# Patient Record
Sex: MALE | Race: WHITE
[De-identification: names, ages, dates, MRNs, and addresses within clinical notes are randomized per-mention and may not be internally consistent; named-entity substitution may affect disease eponyms.]

---

## 2021-03-12 ENCOUNTER — HOSPITAL ENCOUNTER (EMERGENCY)
Dept: HOSPITAL 41 - JD.ED | Age: 76
Discharge: HOME | End: 2021-03-12
Payer: MEDICARE

## 2021-03-12 DIAGNOSIS — K52.9: Primary | ICD-10-CM

## 2021-03-12 DIAGNOSIS — Z79.899: ICD-10-CM

## 2021-03-12 DIAGNOSIS — J45.909: ICD-10-CM

## 2021-03-12 PROCEDURE — 74019 RADEX ABDOMEN 2 VIEWS: CPT

## 2021-03-12 PROCEDURE — 96375 TX/PRO/DX INJ NEW DRUG ADDON: CPT

## 2021-03-12 PROCEDURE — 85025 COMPLETE CBC W/AUTO DIFF WBC: CPT

## 2021-03-12 PROCEDURE — 96374 THER/PROPH/DIAG INJ IV PUSH: CPT

## 2021-03-12 PROCEDURE — 99284 EMERGENCY DEPT VISIT MOD MDM: CPT

## 2021-03-12 PROCEDURE — 36415 COLL VENOUS BLD VENIPUNCTURE: CPT

## 2021-03-12 PROCEDURE — 80053 COMPREHEN METABOLIC PANEL: CPT

## 2021-03-12 NOTE — CR
Abdomen: Upright view the abdomen was obtained as well as supine exam.

 

Bowel gas pattern appears within normal limits.  There are air-fluid 

levels being seen within the colon presumably due to diarrhea.

 

Density is seen within the left mid upper abdomen either representing 

dystrophic calcification or bowel content.  Phleboliths are seen 

within the pelvis.  

 

Mild degenerative change is seen within the sacroiliac joints with 

endplate spurring also noted within the spine.  No free air is seen.  

There is slight pleural thickening seen within the right lung base 

which is stable.

 

Impression:

1.  Air-fluid levels within nondilated colon presumably due to 

diarrhea.

2.  Other findings believed to be incidental as noted above.

 

Diagnostic code #2

## 2021-03-12 NOTE — EDM.PDOC
ED HPI GENERAL MEDICAL PROBLEM





- General


Chief Complaint: Gastrointestinal Problem


Stated Complaint: VOMITING


Time Seen by Provider: 03/12/21 11:44


Source of Information: Reports: Patient, Family (wife), RN Notes Reviewed


History Limitations: Reports: No Limitations





- History of Present Illness


INITIAL COMMENTS - FREE TEXT/NARRATIVE: 





Patient is a 75-year-old male who presents to the ED with his wife for 

evaluation of his vomiting and diarrhea.  The patient and his wife notes that 

around 3 or 4 this morning, he developed some severe diarrhea and vomiting bile.

 He is complaining of some diaphoresis and hot he is complaining of generalized 

body aches but no chest pain, no abdomen pain  flashes but no fevers or chills, 

Or any other major issues.  The patient thinks that he could maybe have a touch 

of food poisoning but his wife ate the same supper he had last night without 

issues.  Patient did have his second COVID-19 shot on Wednesday, roughly 2 days 

ago.  No one else is sick like this within the household.  Patient's primary 

care provider is Dr. Vincent.  Again patient denies any other sick-like symptoms,

fever/chills, cough/shortness of breath.





  ** Generalized


Pain Score (Numeric/FACES): 4





- Related Data


                                    Allergies











Allergy/AdvReac Type Severity Reaction Status Date / Time


 


No Known Allergies Allergy   Verified 03/12/21 11:47











Home Meds: 


                                    Home Meds





Fluticasone Propionate [Flovent  MCG] 1 puff INH BID 05/21/18 [History]


ALPRAZolam [Xanax] 0.25 mg PO TID 10/22/20 [History]


Albuterol [Proventil HFA] 2 puff INH Q4H 10/22/20 [History]


Cholecalciferol (Vitamin D3) [Vitamin D] 0 unit PO DAILY 10/22/20 [History]


Dapsone 1 tab PO BID 10/22/20 [History]


Fexofenadine [Allegra] 180 mg PO DAILY 10/22/20 [History]


Ibuprofen [Ibuprofen Ib] 200 - 600 mg PO Q6H PRN 10/22/20 [History]


Multivit-Min/FA/Lycopen/Lutein [Centrum Silver Ultra Men's] 1 tab PO DAILY 10

/22/20 [History]


Triamcinolone Acetonide [Triamcinolone Acetonide 0.1% Crm] 1 applic TOP BID 

10/22/20 [History]


Vitamin A 2,400 mg PO DAILY 10/22/20 [History]


Vitamin E Acetate [Vitamin E] 0 unit PO DAILY 10/22/20 [History]


amLODIPine [Norvasc] 5 mg PO DAILY 10/22/20 [History]


hydrOXYzine HCL [hydrOXYzine] 25 mg PO BID PRN 10/22/20 [History]


Non-Formulary Medication [NF Drug] 1 puff INH BID 10/23/20 [History]


Albuterol/Ipratropium [DuoNeb 3.0-0.5 MG/3 ML] 3 ml NEB Q4H PRN  neb 10/25/20 

[Rx]


Azithromycin [Zithromax] 500 mg PO Q24H #5 adv 10/25/20 [Rx]


Nicotine [Habitrol] 14 mg TRDERM DAILY #20 patch 10/25/20 [Rx]


methylPREDNISolone [Medrol Dose Pack] 4 mg PO DAILY #1 dospk 10/25/20 [Rx]


Metoclopramide HCl [Reglan] 10 mg PO TID PRN #21 tablet 03/12/21 [Rx]











Past Medical History


HEENT History: Reports: Allergic Rhinitis, Other (See Below)


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


Respiratory History: Reports: Asthma


Gastrointestinal History: Reports: Hemorrhoids, Other (See Below)


Other Gastrointestinal History: pyloric stenosis as an infant


Genitourinary History: Reports: Other (See Below)


Other Genitourinary History: prostate surgery


Psychiatric History: Reports: Anxiety


Other Psychiatric History: utilizes chewing tobacco


Oncologic (Cancer) History: Reports: Prostate


Dermatologic History: Reports: Other (See Below)


Other Dermatologic History: Bullous Pemphigoid





- Past Surgical History


GI Surgical History: Reports: Colonoscopy, Other (See Below)


Other GI Surgeries/Procedures: surgery on hemorrhoids several years ago


Male  Surgical History: Reports: Prostatectomy


Dermatological Surgical History: Reports: Skin Biopsy, Skin Graft





Social & Family History





- Family History


Family Medical History: No Pertinent Family History





- Caffeine Use


Caffeine Use: Reports: None





- Living Situation & Occupation


Living situation: Reports: , with Spouse


Occupation: Retired





ED ROS GENERAL





- Review of Systems


Review Of Systems: Comprehensive ROS is negative, except as noted in HPI.





ED EXAM, GI/ABD





- Physical Exam


Exam: See Below


Exam Limited By: No Limitations


General Appearance: Alert, WD/WN, No Apparent Distress, Other (generalized 

diaphoresis)


Throat/Mouth: Normal Inspection, Normal Lips, Normal Teeth, Normal Gums, Normal 

Oropharynx, Normal Voice, No Airway Compromise


Head: Atraumatic, Normocephalic


Neck: Normal Inspection


Respiratory/Chest: No Respiratory Distress, Lungs Clear, Normal Breath Sounds, 

No Accessory Muscle Use, Chest Non-Tender


Cardiovascular: Normal Peripheral Pulses, Regular Rate, Rhythm, No Edema


GI/Abdominal Exam: Normal Bowel Sounds, Soft, Non-Tender, No Distention, No Mass


Extremities: Normal Inspection, Normal Capillary Refill


Neurological: Alert, Oriented, Normal Cognition, No Motor/Sensory Deficits


Psychiatric: Normal Affect, Normal Mood


Skin Exam: Diaphoretic (generalized)





Course





- Vital Signs


Last Recorded V/S: 


                                Last Vital Signs











Temp      


 


Pulse  119 H  03/12/21 11:42


 


Resp  32 H  03/12/21 11:42


 


BP  118/88   03/12/21 11:42


 


Pulse Ox  97   03/12/21 11:42














- Orders/Labs/Meds


Orders: 


                               Active Orders 24 hr











 Category Date Time Status


 


 Peripheral IV Care [RC] .AS DIRECTED Care  03/12/21 11:50 Ordered


 


 Sodium Chloride 0.9% [Saline Flush] Med  03/12/21 11:50 Active





 10 ml FLUSH ASDIRECTED PRN   


 


 Peripheral IV Insertion Adult [OM.PC] Routine Oth  03/12/21 11:49 Ordered











Labs: 


                                Laboratory Tests











  03/12/21 03/12/21 Range/Units





  12:00 12:00 


 


WBC  7.80   (4.23-9.07)  K/mm3


 


RBC  5.78   (4.63-6.08)  M/mm3


 


Hgb  17.7 H D   (13.7-17.5)  gm/dl


 


Hct  55.3 H   (40.1-51.0)  %


 


MCV  95.7 H   (79.0-92.2)  fl


 


MCH  30.6   (25.7-32.2)  pg


 


MCHC  32.0 L   (32.2-35.5)  g/dl


 


RDW Std Deviation  51.5 H   (35.1-43.9)  fL


 


Plt Count  273   (163-337)  K/mm3


 


MPV  9.9   (9.4-12.3)  fl


 


Neut % (Auto)  83.1 H   (34.0-67.9)  %


 


Lymph % (Auto)  4.1 L   (21.8-53.1)  %


 


Mono % (Auto)  11.5   (5.3-12.2)  %


 


Eos % (Auto)  0.3 L   (0.8-7.0)  


 


Baso % (Auto)  0.1   (0.1-1.2)  %


 


Neut # (Auto)  6.48 H   (1.78-5.38)  K/mm3


 


Lymph # (Auto)  0.32 L   (1.32-3.57)  K/mm3


 


Mono # (Auto)  0.90 H   (0.30-0.82)  K/mm3


 


Eos # (Auto)  0.02 L   (0.04-0.54)  K/mm3


 


Baso # (Auto)  0.01   (0.01-0.08)  K/mm3


 


Manual Slide Review  Abnormal smear   


 


Sodium   143  (136-145)  mEq/L


 


Potassium   3.8  (3.5-5.1)  mEq/L


 


Chloride   105  ()  mEq/L


 


Carbon Dioxide   23  (21-32)  mEq/L


 


Anion Gap   18.8 H  (5-15)  


 


BUN   26 H  (7-18)  mg/dL


 


Creatinine   1.5 H  (0.7-1.3)  mg/dL


 


Est Cr Clr Drug Dosing   41.24  mL/min


 


Estimated GFR (MDRD)   45  (>60)  mL/min


 


BUN/Creatinine Ratio   17.3  (14-18)  


 


Glucose   229 H  ()  mg/dL


 


Calcium   8.9  (8.5-10.1)  mg/dL


 


Total Bilirubin   1.0  (0.2-1.0)  mg/dL


 


AST   27  (15-37)  U/L


 


ALT   52  (16-63)  U/L


 


Alkaline Phosphatase   102  ()  U/L


 


Total Protein   6.7  (6.4-8.2)  g/dl


 


Albumin   3.5  (3.4-5.0)  g/dl


 


Globulin   3.2  gm/dL


 


Albumin/Globulin Ratio   1.1  (1-2)  














- Re-Assessments/Exams


Free Text/Narrative Re-Assessment/Exam: 





03/12/21 12:13


Patient presents to the ED for the evaluation of his vomiting and diarrhea.  

Will go ahead and get IV started get some basic labs, give some IV fluids and 

nausea meds.  This could be a reaction to the Covid vaccine, less likely the 

possibility of food poisoning however the timing is suspicious for such.





03/12/21 12:36


The patient CBC did come back, demonstrates no focal abnormalities.  Patient's 

metabolic panel is impressive for anion gap elevated to 18.8, his glucose is 

elevated at 229.  BUN and creatinine are also slightly elevated suggesting 

dehydration.  All other electrolytes are within normal limits.  We will go ahead

 and monitor him, given some more IV fluids, and check to make sure his nausea 

is under control.





03/12/21 13:32


She was reassessed at bedside, and states that he is feeling better however he 

is having some generalized abdomen discomfort due to the retching.  And a slight

 bit of nausea is still noted.  We will go ahead and give him some Reglan, and 

another bag of fluids for management.





03/12/21 14:46


The patient states that his nausea has resolved, he notes that the Reglan seemed

 to work better.  He is feeling much better we will go ahead and get him 

discharged after his bag of fluids has been completed.  He was able to keep some

 water down in the ER without difficulty.





Departure





- Departure


Time of Disposition: 14:47


Disposition: Home, Self-Care 01


Condition: Good


Clinical Impression: 


 Gastroenteritis








- Discharge Information


*PRESCRIPTION DRUG MONITORING PROGRAM REVIEWED*: No


*COPY OF PRESCRIPTION DRUG MONITORING REPORT IN PATIENT [place]: No


Instructions:  Viral Gastroenteritis, Adult, Easy-to-Read


Referrals: 


Willie Vincent Jr, MD [Primary Care Provider] - 


Forms:  ED Department Discharge


Additional Instructions: 


You have been evaluated in the ED for nausea/vomiting/diarrhea.





It is likely that this is caused from a viral gastroenteritis.





You have received IV fluid in the ED to help with the dehydration from the 

vomiting and diarrhea.





Over the next 24-48 hours please try to limit diet to clear liquids and advance 

as tolerated to a bland diet to alleviate symptoms of nausea/vomiting/diarrhea.





Please use the Reglan 3 times a day as needed for nausea.





Please return to the ED if your symptoms should change or worsen.





Sepsis Event Note (ED)





- Evaluation


Sepsis Screening Result: No Definite Risk





- Focused Exam


Vital Signs: 


                                   Vital Signs











  Pulse Resp BP Pulse Ox


 


 03/12/21 11:42  119 H  32 H  118/88  97














- My Orders


Last 24 Hours: 


My Active Orders





03/12/21 11:49


Peripheral IV Insertion Adult [OM.PC] Routine 





03/12/21 11:50


Peripheral IV Care [RC] .AS DIRECTED 


Sodium Chloride 0.9% [Saline Flush]   10 ml FLUSH ASDIRECTED PRN 














- Assessment/Plan


Last 24 Hours: 


My Active Orders





03/12/21 11:49


Peripheral IV Insertion Adult [OM.PC] Routine 





03/12/21 11:50


Peripheral IV Care [RC] .AS DIRECTED 


Sodium Chloride 0.9% [Saline Flush]   10 ml FLUSH ASDIRECTED PRN